# Patient Record
Sex: FEMALE | Race: AMERICAN INDIAN OR ALASKA NATIVE | NOT HISPANIC OR LATINO | Employment: OTHER | URBAN - METROPOLITAN AREA
[De-identification: names, ages, dates, MRNs, and addresses within clinical notes are randomized per-mention and may not be internally consistent; named-entity substitution may affect disease eponyms.]

---

## 2019-05-22 NOTE — TELEPHONE ENCOUNTER
RECORDS STATUS - ALL OTHER DIAGNOSIS      RECORDS RECEIVED FROM: Juan/[facility]   DATE RECEIVED:    NOTES STATUS DETAILS   OFFICE NOTE from referring provider Self CE/Epic   OFFICE NOTE from medical oncologist     DISCHARGE SUMMARY from hospital     DISCHARGE REPORT from the ER CE/Epic 5/21/19   OPERATIVE REPORT     MEDICATION LIST CE-Juan As of 5/21/19   CLINICAL TRIAL TREATMENTS TO DATE     LABS     PATHOLOGY REPORTS     ANYTHING RELATED TO DIAGNOSIS CE/Epic 5/21/19   GENONOMIC TESTING     TYPE:     IMAGING (NEED IMAGES & REPORT)     CT SCANS Requested Juan, Report in CE, 5/21/19   MRI     MAMMO     ULTRASOUND     PET     XR Requested Juan, Report in CE, 5/21/19

## 2019-05-22 NOTE — TELEPHONE ENCOUNTER
ONCOLOGY INTAKE: Records Information      APPT INFORMATION:  Referring provider:  Self  Referring provider s clinic:  [facility]  Reason for visit/diagnosis:  Lung Nodule  Has patient been notified of appointment date and time?: Yes    RECORDS INFORMATION:  Were the records received with the referral (via [employer])? No    Has patient been seen for any external appt for this diagnosis? Yes  If yes, where? [facility]      Has patient had any imaging or procedures outside of [place] for this condition? Yes-Patient had CT      If Yes, where? [facility]      ADDITIONAL INFORMATION:              Dr. Addison Sinclair was doctor in ER at [facility]

## 2019-06-25 ENCOUNTER — PRE VISIT (OUTPATIENT)
Dept: PULMONOLOGY | Facility: CLINIC | Age: 59
End: 2019-06-25

## 2021-05-27 ENCOUNTER — RECORDS - HEALTHEAST (OUTPATIENT)
Dept: ADMINISTRATIVE | Facility: CLINIC | Age: 61
End: 2021-05-27